# Patient Record
Sex: FEMALE | Race: OTHER
[De-identification: names, ages, dates, MRNs, and addresses within clinical notes are randomized per-mention and may not be internally consistent; named-entity substitution may affect disease eponyms.]

---

## 2020-11-29 ENCOUNTER — HOSPITAL ENCOUNTER (INPATIENT)
Dept: HOSPITAL 54 - GPS | Age: 60
LOS: 10 days | Discharge: HOME | DRG: 885 | End: 2020-12-09
Attending: INTERNAL MEDICINE | Admitting: PSYCHIATRY & NEUROLOGY
Payer: MEDICARE

## 2020-11-29 VITALS — BODY MASS INDEX: 27.82 KG/M2 | HEIGHT: 65 IN | WEIGHT: 167 LBS

## 2020-11-29 VITALS — DIASTOLIC BLOOD PRESSURE: 70 MMHG | SYSTOLIC BLOOD PRESSURE: 125 MMHG

## 2020-11-29 DIAGNOSIS — E87.6: ICD-10-CM

## 2020-11-29 DIAGNOSIS — F23: ICD-10-CM

## 2020-11-29 DIAGNOSIS — F32.9: ICD-10-CM

## 2020-11-29 DIAGNOSIS — E78.5: ICD-10-CM

## 2020-11-29 DIAGNOSIS — Z86.19: ICD-10-CM

## 2020-11-29 DIAGNOSIS — F29: Primary | ICD-10-CM

## 2020-11-29 DIAGNOSIS — F41.9: ICD-10-CM

## 2020-11-29 NOTE — NUR
ADMISSION NOTES:

ADMITTED THIS 61Y/O FEMALE PATIENT  ADMIT FROM [facility] ,ADMITTED TO 
GPS 5150 HOLD DTO, PER HOLD  DTO AND INCREASINGLY PARANOID THE PAST WEEK HAS BEEN LEAVING 
VOICE MAIL MESSAGE ON MOTHER,S PHONE THREATING AND DEMENDING MONEY MAKING  COMMENTS OR YOU 
WILL DIE BY THE HANDS OF ME ,,UPON FACE TO FACE ASSESSMENT PATIENT IS A&O X 3, FLAT AFFECT , 
BLUNTED AFFECT, GUARDED ,UNCOOPERTIVE ,ANXIOUS ,PARANOID, ANXIOUS  ,DISHELVED, DEPREESED 
,POOR HYGINE ,EASILY AGITATED,  POOR EYE CONTACT  ,DENIES SI /HI AT THIS TIME, PT. IS POOR 
HISTORIAN, POOR INSIGHT ,POOR JUDGEMENT , PT. REFUSED TO  SIGNS ADMISSION CONSENT PAPERS, 
DUE TO MENTAL STATUS , PT.REFUSED SHOWER AND  REFUSED CHANGE  TO THE HOSPITAL GOWN,  DUE TO 
MENTAL STATUS , PT. REFUSED FULL BODY SKIN ASSESSMENT AND PICTURES TAKEN, AND REFUSED FLU 
VACCINE, ENCOURAGED X3 RISKS BENEFITS EXPLINED , PT. STRONGLY REFUSED ,BOTH MD AWARE AND 
NOTIFIED OF THE ADMISSION,  BELONGINGS CONTRABAND WERE DONE ,PT. RIGHTS DISCUSS BY WRITER , 
PROVIDE THE PT. WITH HANDBOOK, AND MEDICATIONS GUIDE, ENVIRONMENTAL SAFETY CHECK DONE,  
ENCOURAGED PT. VERBALIZED ANY FEELING CONCERN TO STAFF, ORIENT TO UNIT POLICY, NO ACUTE 
DISTRESS NOTED,VITAL SIGNS  WNL ,DENIES ANY PAIN  AT THIS TIME,WILL CONTINUE TO MONITOR FOR 
Q15 SAFETY AND BEHAVIOR.

## 2020-11-30 VITALS — DIASTOLIC BLOOD PRESSURE: 69 MMHG | SYSTOLIC BLOOD PRESSURE: 119 MMHG

## 2020-11-30 VITALS — SYSTOLIC BLOOD PRESSURE: 91 MMHG | DIASTOLIC BLOOD PRESSURE: 62 MMHG

## 2020-11-30 VITALS — SYSTOLIC BLOOD PRESSURE: 92 MMHG | DIASTOLIC BLOOD PRESSURE: 59 MMHG

## 2020-11-30 LAB
ALBUMIN SERPL BCP-MCNC: 3.4 G/DL (ref 3.4–5)
ALP SERPL-CCNC: 56 U/L (ref 46–116)
ALT SERPL W P-5'-P-CCNC: 57 U/L (ref 12–78)
AST SERPL W P-5'-P-CCNC: 28 U/L (ref 15–37)
BILIRUB SERPL-MCNC: 0.7 MG/DL (ref 0.2–1)
BUN SERPL-MCNC: 14 MG/DL (ref 7–18)
CALCIUM SERPL-MCNC: 8.5 MG/DL (ref 8.5–10.1)
CHLORIDE SERPL-SCNC: 106 MMOL/L (ref 98–107)
CHOLEST SERPL-MCNC: 178 MG/DL (ref ?–200)
CO2 SERPL-SCNC: 27 MMOL/L (ref 21–32)
CREAT SERPL-MCNC: 0.8 MG/DL (ref 0.6–1.3)
GLUCOSE SERPL-MCNC: 83 MG/DL (ref 74–106)
HDLC SERPL-MCNC: 61 MG/DL (ref 40–60)
LDLC SERPL DIRECT ASSAY-MCNC: 100 MG/DL (ref 0–99)
POTASSIUM SERPL-SCNC: 3.4 MMOL/L (ref 3.5–5.1)
PROT SERPL-MCNC: 6.4 G/DL (ref 6.4–8.2)
SODIUM SERPL-SCNC: 143 MMOL/L (ref 136–145)
TRIGL SERPL-MCNC: 78 MG/DL (ref 30–150)

## 2020-11-30 RX ADMIN — METHOCARBAMOL TABLETS SCH MG: 500 TABLET, COATED ORAL at 13:58

## 2020-11-30 RX ADMIN — METHOCARBAMOL TABLETS SCH MG: 500 TABLET, COATED ORAL at 17:23

## 2020-11-30 RX ADMIN — GABAPENTIN SCH MG: 100 CAPSULE ORAL at 17:23

## 2020-11-30 RX ADMIN — METHOCARBAMOL TABLETS SCH MG: 500 TABLET, COATED ORAL at 21:02

## 2020-11-30 RX ADMIN — METHOCARBAMOL TABLETS SCH MG: 500 TABLET, COATED ORAL at 08:29

## 2020-11-30 RX ADMIN — LORAZEPAM PRN MG: 1 TABLET ORAL at 10:04

## 2020-11-30 RX ADMIN — GABAPENTIN SCH MG: 100 CAPSULE ORAL at 08:29

## 2020-11-30 RX ADMIN — GABAPENTIN SCH MG: 100 CAPSULE ORAL at 13:58

## 2020-11-30 RX ADMIN — MELOXICAM SCH MG: 7.5 TABLET ORAL at 08:29

## 2020-11-30 NOTE — NUR
RN NOTES: PT. REFUSED MRSA NARES SWAB ,REFUSED SKIN ASSESSMENT , ENCOURAGED X3 RISKS AND 
BENFITS EXPLINED ,PT. STRONGLY REFUSED,  PT. BEHAVIOR UNCOOPRTIVE AT TIS TIME,WILL CONTIUITY 
WITH CARE

## 2020-12-01 VITALS — SYSTOLIC BLOOD PRESSURE: 92 MMHG | DIASTOLIC BLOOD PRESSURE: 58 MMHG

## 2020-12-01 VITALS — DIASTOLIC BLOOD PRESSURE: 54 MMHG | SYSTOLIC BLOOD PRESSURE: 94 MMHG

## 2020-12-01 VITALS — DIASTOLIC BLOOD PRESSURE: 54 MMHG | SYSTOLIC BLOOD PRESSURE: 95 MMHG

## 2020-12-01 RX ADMIN — LORAZEPAM PRN MG: 1 TABLET ORAL at 06:12

## 2020-12-01 RX ADMIN — GABAPENTIN SCH MG: 100 CAPSULE ORAL at 08:19

## 2020-12-01 RX ADMIN — LORAZEPAM PRN MG: 1 TABLET ORAL at 08:19

## 2020-12-01 RX ADMIN — METHOCARBAMOL TABLETS SCH MG: 500 TABLET, COATED ORAL at 21:32

## 2020-12-01 RX ADMIN — LORAZEPAM PRN MG: 1 TABLET ORAL at 12:01

## 2020-12-01 RX ADMIN — GABAPENTIN SCH MG: 100 CAPSULE ORAL at 17:24

## 2020-12-01 RX ADMIN — QUETIAPINE FUMARATE SCH MG: 100 TABLET ORAL at 22:49

## 2020-12-01 RX ADMIN — METHOCARBAMOL TABLETS SCH MG: 500 TABLET, COATED ORAL at 17:24

## 2020-12-01 RX ADMIN — GABAPENTIN SCH MG: 100 CAPSULE ORAL at 12:01

## 2020-12-01 RX ADMIN — METHOCARBAMOL TABLETS SCH MG: 500 TABLET, COATED ORAL at 08:19

## 2020-12-01 RX ADMIN — MELOXICAM SCH MG: 7.5 TABLET ORAL at 08:19

## 2020-12-01 RX ADMIN — METHOCARBAMOL TABLETS SCH MG: 500 TABLET, COATED ORAL at 12:01

## 2020-12-01 NOTE — NUR
INITIAL DISCHARGE PLAN: Pt will be discharged back to her home [address] [place] [address]. AARTI will help form a safe and proper discharge in collaboration with MD.
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
## 2020-12-02 VITALS — DIASTOLIC BLOOD PRESSURE: 47 MMHG | SYSTOLIC BLOOD PRESSURE: 96 MMHG

## 2020-12-02 VITALS — DIASTOLIC BLOOD PRESSURE: 72 MMHG | SYSTOLIC BLOOD PRESSURE: 103 MMHG

## 2020-12-02 VITALS — SYSTOLIC BLOOD PRESSURE: 100 MMHG | DIASTOLIC BLOOD PRESSURE: 67 MMHG

## 2020-12-02 RX ADMIN — QUETIAPINE FUMARATE SCH MG: 100 TABLET ORAL at 08:52

## 2020-12-02 RX ADMIN — GABAPENTIN SCH MG: 100 CAPSULE ORAL at 08:51

## 2020-12-02 RX ADMIN — METHOCARBAMOL TABLETS SCH MG: 500 TABLET, COATED ORAL at 08:51

## 2020-12-02 RX ADMIN — LORAZEPAM PRN MG: 1 TABLET ORAL at 06:48

## 2020-12-02 RX ADMIN — GABAPENTIN SCH MG: 100 CAPSULE ORAL at 17:49

## 2020-12-02 RX ADMIN — GABAPENTIN SCH MG: 100 CAPSULE ORAL at 12:25

## 2020-12-02 RX ADMIN — METHOCARBAMOL TABLETS SCH MG: 500 TABLET, COATED ORAL at 17:49

## 2020-12-02 RX ADMIN — METHOCARBAMOL TABLETS SCH MG: 500 TABLET, COATED ORAL at 12:24

## 2020-12-02 RX ADMIN — LORAZEPAM PRN MG: 1 TABLET ORAL at 12:25

## 2020-12-02 RX ADMIN — MELOXICAM SCH MG: 7.5 TABLET ORAL at 08:52

## 2020-12-02 RX ADMIN — METHOCARBAMOL TABLETS SCH MG: 500 TABLET, COATED ORAL at 20:50

## 2020-12-02 RX ADMIN — QUETIAPINE FUMARATE SCH MG: 100 TABLET ORAL at 17:49

## 2020-12-02 NOTE — NUR
GPS RN NOTE: ANXIETY

PT C/O ANXIETY REQUESTED ATIVAN, VSS, ADMIN ATIVAN 1MG PRN @ 0648, WILL REASSESS AND 
CONTINUE TO MONITOR Q15MIN FOR SAFETY AND BEHAVIOR.

## 2020-12-03 VITALS — DIASTOLIC BLOOD PRESSURE: 53 MMHG | SYSTOLIC BLOOD PRESSURE: 102 MMHG

## 2020-12-03 VITALS — SYSTOLIC BLOOD PRESSURE: 99 MMHG | DIASTOLIC BLOOD PRESSURE: 59 MMHG

## 2020-12-03 VITALS — DIASTOLIC BLOOD PRESSURE: 62 MMHG | SYSTOLIC BLOOD PRESSURE: 108 MMHG

## 2020-12-03 RX ADMIN — QUETIAPINE FUMARATE SCH MG: 100 TABLET ORAL at 16:01

## 2020-12-03 RX ADMIN — METHOCARBAMOL TABLETS SCH MG: 500 TABLET, COATED ORAL at 21:00

## 2020-12-03 RX ADMIN — GABAPENTIN SCH MG: 100 CAPSULE ORAL at 16:01

## 2020-12-03 RX ADMIN — METHOCARBAMOL TABLETS SCH MG: 500 TABLET, COATED ORAL at 16:02

## 2020-12-03 RX ADMIN — METHOCARBAMOL TABLETS SCH MG: 500 TABLET, COATED ORAL at 08:13

## 2020-12-03 RX ADMIN — METHOCARBAMOL TABLETS SCH MG: 500 TABLET, COATED ORAL at 12:24

## 2020-12-03 RX ADMIN — GABAPENTIN SCH MG: 100 CAPSULE ORAL at 12:25

## 2020-12-03 RX ADMIN — GABAPENTIN SCH MG: 100 CAPSULE ORAL at 08:12

## 2020-12-03 RX ADMIN — LORAZEPAM PRN MG: 1 TABLET ORAL at 13:00

## 2020-12-03 RX ADMIN — QUETIAPINE FUMARATE SCH MG: 100 TABLET ORAL at 08:11

## 2020-12-03 RX ADMIN — MELOXICAM SCH MG: 7.5 TABLET ORAL at 08:13

## 2020-12-03 RX ADMIN — LORAZEPAM PRN MG: 1 TABLET ORAL at 06:49

## 2020-12-03 RX ADMIN — LORAZEPAM PRN MG: 1 TABLET ORAL at 23:40

## 2020-12-03 NOTE — NUR
GPS RN NOTE: MEDICATION REFUSAL



PATIENT REUSED TO TAKE ROBAXIN 500 MG AS SCHEDULED, STATED," I TOOK IT ALREADY SO MANY 
TIMES, I DON'T NEED IT TONIGHT." DESPITE OF RISKS & BENEFIT EXPLANATIONS PATIENT CONTINUED 
TO REFUSE ROBAXIN X 3. WILL CONTINUE TO MONITOR.

## 2020-12-03 NOTE — NUR
GPS RN NOTE: ANXIETY



PATIENT VERBALIZED OF FEELING ANXIOUS, RESTLESS & REQUESTED TO GET ATIVAN. PRN ATIVAN 1 MG 
PO GIVEN AS ORDERED. WILL CONTINUE TO MONITOR FOR ANY CHANGES.

## 2020-12-04 VITALS — SYSTOLIC BLOOD PRESSURE: 101 MMHG | DIASTOLIC BLOOD PRESSURE: 65 MMHG

## 2020-12-04 VITALS — DIASTOLIC BLOOD PRESSURE: 59 MMHG | SYSTOLIC BLOOD PRESSURE: 111 MMHG

## 2020-12-04 VITALS — SYSTOLIC BLOOD PRESSURE: 103 MMHG | DIASTOLIC BLOOD PRESSURE: 69 MMHG

## 2020-12-04 RX ADMIN — LORAZEPAM PRN MG: 1 TABLET ORAL at 06:52

## 2020-12-04 RX ADMIN — METHOCARBAMOL TABLETS SCH MG: 500 TABLET, COATED ORAL at 21:04

## 2020-12-04 RX ADMIN — GABAPENTIN SCH MG: 100 CAPSULE ORAL at 12:23

## 2020-12-04 RX ADMIN — QUETIAPINE FUMARATE SCH MG: 100 TABLET ORAL at 16:20

## 2020-12-04 RX ADMIN — METHOCARBAMOL TABLETS SCH MG: 500 TABLET, COATED ORAL at 08:38

## 2020-12-04 RX ADMIN — MELOXICAM SCH MG: 7.5 TABLET ORAL at 08:38

## 2020-12-04 RX ADMIN — METHOCARBAMOL TABLETS SCH MG: 500 TABLET, COATED ORAL at 12:23

## 2020-12-04 RX ADMIN — LORAZEPAM PRN MG: 1 TABLET ORAL at 20:07

## 2020-12-04 RX ADMIN — GABAPENTIN SCH MG: 100 CAPSULE ORAL at 16:20

## 2020-12-04 RX ADMIN — METHOCARBAMOL TABLETS SCH MG: 500 TABLET, COATED ORAL at 16:20

## 2020-12-04 RX ADMIN — GABAPENTIN SCH MG: 100 CAPSULE ORAL at 08:38

## 2020-12-04 RX ADMIN — LORAZEPAM PRN MG: 1 TABLET ORAL at 13:01

## 2020-12-04 RX ADMIN — QUETIAPINE FUMARATE SCH MG: 100 TABLET ORAL at 08:38

## 2020-12-04 NOTE — NUR
GPS RN NOTE: ANXIETY



PATIENT VERBALIZED OF FEELING ANXIOUS, RESTLESS & REQUESTED TO GET ATIVAN. PRN ATIVAN 1 MG 
PO GIVEN AS ORDERED. WILL ENDORSE TO AM RN TO MONITOR FOR ANY CHANGES.

## 2020-12-05 VITALS — SYSTOLIC BLOOD PRESSURE: 101 MMHG | DIASTOLIC BLOOD PRESSURE: 62 MMHG

## 2020-12-05 VITALS — SYSTOLIC BLOOD PRESSURE: 94 MMHG | DIASTOLIC BLOOD PRESSURE: 56 MMHG

## 2020-12-05 VITALS — SYSTOLIC BLOOD PRESSURE: 111 MMHG | DIASTOLIC BLOOD PRESSURE: 57 MMHG

## 2020-12-05 RX ADMIN — METHOCARBAMOL TABLETS SCH MG: 500 TABLET, COATED ORAL at 13:15

## 2020-12-05 RX ADMIN — LORAZEPAM PRN MG: 1 TABLET ORAL at 15:51

## 2020-12-05 RX ADMIN — LORAZEPAM PRN MG: 1 TABLET ORAL at 06:33

## 2020-12-05 RX ADMIN — SERTRALINE HYDROCHLORIDE SCH MG: 50 TABLET, FILM COATED ORAL at 08:53

## 2020-12-05 RX ADMIN — QUETIAPINE FUMARATE SCH MG: 100 TABLET ORAL at 17:35

## 2020-12-05 RX ADMIN — METHOCARBAMOL TABLETS SCH MG: 500 TABLET, COATED ORAL at 17:33

## 2020-12-05 RX ADMIN — MELOXICAM SCH MG: 7.5 TABLET ORAL at 08:53

## 2020-12-05 RX ADMIN — GABAPENTIN SCH MG: 100 CAPSULE ORAL at 13:15

## 2020-12-05 RX ADMIN — METHOCARBAMOL TABLETS SCH MG: 500 TABLET, COATED ORAL at 20:31

## 2020-12-05 RX ADMIN — QUETIAPINE FUMARATE SCH MG: 100 TABLET ORAL at 08:53

## 2020-12-05 RX ADMIN — GABAPENTIN SCH MG: 100 CAPSULE ORAL at 08:53

## 2020-12-05 RX ADMIN — METHOCARBAMOL TABLETS SCH MG: 500 TABLET, COATED ORAL at 08:53

## 2020-12-05 RX ADMIN — GABAPENTIN SCH MG: 100 CAPSULE ORAL at 17:33

## 2020-12-05 NOTE — NUR
RN NOTES: ANXIETY 

PT.C/O FEELING ANXIOUS ,ATIVAN 1 MG PO PRN GIVEN PER PT. REQUEST,WILL CONTINUE TO MONITOR.

## 2020-12-06 VITALS — SYSTOLIC BLOOD PRESSURE: 91 MMHG | DIASTOLIC BLOOD PRESSURE: 42 MMHG

## 2020-12-06 VITALS — DIASTOLIC BLOOD PRESSURE: 64 MMHG | SYSTOLIC BLOOD PRESSURE: 100 MMHG

## 2020-12-06 VITALS — SYSTOLIC BLOOD PRESSURE: 118 MMHG | DIASTOLIC BLOOD PRESSURE: 69 MMHG

## 2020-12-06 RX ADMIN — QUETIAPINE FUMARATE SCH MG: 100 TABLET ORAL at 08:22

## 2020-12-06 RX ADMIN — LORAZEPAM PRN MG: 1 TABLET ORAL at 14:06

## 2020-12-06 RX ADMIN — GABAPENTIN SCH MG: 100 CAPSULE ORAL at 08:22

## 2020-12-06 RX ADMIN — MELOXICAM SCH MG: 7.5 TABLET ORAL at 08:22

## 2020-12-06 RX ADMIN — LORAZEPAM PRN MG: 1 TABLET ORAL at 06:16

## 2020-12-06 RX ADMIN — METHOCARBAMOL TABLETS SCH MG: 500 TABLET, COATED ORAL at 16:14

## 2020-12-06 RX ADMIN — SERTRALINE HYDROCHLORIDE SCH MG: 50 TABLET, FILM COATED ORAL at 08:22

## 2020-12-06 RX ADMIN — QUETIAPINE FUMARATE SCH MG: 100 TABLET ORAL at 16:13

## 2020-12-06 RX ADMIN — GABAPENTIN SCH MG: 100 CAPSULE ORAL at 16:13

## 2020-12-06 RX ADMIN — GABAPENTIN SCH MG: 100 CAPSULE ORAL at 12:13

## 2020-12-06 RX ADMIN — METHOCARBAMOL TABLETS SCH MG: 500 TABLET, COATED ORAL at 12:13

## 2020-12-06 RX ADMIN — METHOCARBAMOL TABLETS SCH MG: 500 TABLET, COATED ORAL at 21:13

## 2020-12-06 RX ADMIN — METHOCARBAMOL TABLETS SCH MG: 500 TABLET, COATED ORAL at 08:27

## 2020-12-06 NOTE — NUR
RN NOTE- PT IN ROOM AND IN HALLS VISIBLE ON UNIT ALERT ORIENTED TO PERSON PLACE TIME AND 
SITUATION MED COMPLIANT NEEDS ATTENDED DENIES ALL

## 2020-12-07 VITALS — DIASTOLIC BLOOD PRESSURE: 63 MMHG | SYSTOLIC BLOOD PRESSURE: 91 MMHG

## 2020-12-07 VITALS — SYSTOLIC BLOOD PRESSURE: 118 MMHG | DIASTOLIC BLOOD PRESSURE: 59 MMHG

## 2020-12-07 VITALS — DIASTOLIC BLOOD PRESSURE: 60 MMHG | SYSTOLIC BLOOD PRESSURE: 110 MMHG

## 2020-12-07 VITALS — DIASTOLIC BLOOD PRESSURE: 60 MMHG | SYSTOLIC BLOOD PRESSURE: 93 MMHG

## 2020-12-07 RX ADMIN — METHOCARBAMOL TABLETS SCH MG: 500 TABLET, COATED ORAL at 20:39

## 2020-12-07 RX ADMIN — LORAZEPAM PRN MG: 1 TABLET ORAL at 20:38

## 2020-12-07 RX ADMIN — QUETIAPINE FUMARATE SCH MG: 100 TABLET ORAL at 16:10

## 2020-12-07 RX ADMIN — GABAPENTIN SCH MG: 100 CAPSULE ORAL at 13:08

## 2020-12-07 RX ADMIN — GABAPENTIN SCH MG: 100 CAPSULE ORAL at 08:29

## 2020-12-07 RX ADMIN — METHOCARBAMOL TABLETS SCH MG: 500 TABLET, COATED ORAL at 16:10

## 2020-12-07 RX ADMIN — METHOCARBAMOL TABLETS SCH MG: 500 TABLET, COATED ORAL at 13:07

## 2020-12-07 RX ADMIN — QUETIAPINE FUMARATE SCH MG: 100 TABLET ORAL at 08:28

## 2020-12-07 RX ADMIN — LORAZEPAM PRN MG: 1 TABLET ORAL at 06:23

## 2020-12-07 RX ADMIN — LORAZEPAM PRN MG: 1 TABLET ORAL at 13:07

## 2020-12-07 RX ADMIN — SERTRALINE HYDROCHLORIDE SCH MG: 50 TABLET, FILM COATED ORAL at 08:28

## 2020-12-07 RX ADMIN — GABAPENTIN SCH MG: 100 CAPSULE ORAL at 16:10

## 2020-12-07 RX ADMIN — MELOXICAM SCH MG: 7.5 TABLET ORAL at 08:28

## 2020-12-07 RX ADMIN — METHOCARBAMOL TABLETS SCH MG: 500 TABLET, COATED ORAL at 08:28

## 2020-12-07 NOTE — NUR
GPS RN NOTE: ANXIETY

PT C/O OF ANXIETY, ADMIN PRN ATIVAN @ 0623, VSS, BP: 110/60, HR: 57, WILL REASSESS AND 
CONTINUE TO MONITOR Q15MIN FRO SAFETY AND BEHAVIOR

## 2020-12-08 VITALS — SYSTOLIC BLOOD PRESSURE: 108 MMHG | DIASTOLIC BLOOD PRESSURE: 55 MMHG

## 2020-12-08 VITALS — SYSTOLIC BLOOD PRESSURE: 97 MMHG | DIASTOLIC BLOOD PRESSURE: 65 MMHG

## 2020-12-08 VITALS — DIASTOLIC BLOOD PRESSURE: 82 MMHG | SYSTOLIC BLOOD PRESSURE: 111 MMHG

## 2020-12-08 RX ADMIN — LORAZEPAM PRN MG: 1 TABLET ORAL at 22:12

## 2020-12-08 RX ADMIN — GABAPENTIN SCH MG: 100 CAPSULE ORAL at 17:23

## 2020-12-08 RX ADMIN — METHOCARBAMOL TABLETS SCH MG: 500 TABLET, COATED ORAL at 12:58

## 2020-12-08 RX ADMIN — METHOCARBAMOL TABLETS SCH MG: 500 TABLET, COATED ORAL at 21:00

## 2020-12-08 RX ADMIN — LORAZEPAM PRN MG: 1 TABLET ORAL at 12:58

## 2020-12-08 RX ADMIN — METHOCARBAMOL TABLETS SCH MG: 500 TABLET, COATED ORAL at 17:23

## 2020-12-08 RX ADMIN — MELOXICAM SCH MG: 7.5 TABLET ORAL at 08:51

## 2020-12-08 RX ADMIN — GABAPENTIN SCH MG: 100 CAPSULE ORAL at 08:51

## 2020-12-08 RX ADMIN — GABAPENTIN SCH MG: 100 CAPSULE ORAL at 12:58

## 2020-12-08 RX ADMIN — QUETIAPINE FUMARATE SCH MG: 100 TABLET ORAL at 17:25

## 2020-12-08 RX ADMIN — SERTRALINE HYDROCHLORIDE SCH MG: 50 TABLET, FILM COATED ORAL at 08:51

## 2020-12-08 RX ADMIN — METHOCARBAMOL TABLETS SCH MG: 500 TABLET, COATED ORAL at 08:52

## 2020-12-08 RX ADMIN — QUETIAPINE FUMARATE SCH MG: 100 TABLET ORAL at 08:52

## 2020-12-08 RX ADMIN — LORAZEPAM PRN MG: 1 TABLET ORAL at 06:10

## 2020-12-08 NOTE — NUR
RN Note:



Patient refused her scheduled Robaxin 500mg PO at 21:00.Requested and given Ativan 1 mg PO 
with good effect.

## 2020-12-09 VITALS — SYSTOLIC BLOOD PRESSURE: 111 MMHG | DIASTOLIC BLOOD PRESSURE: 76 MMHG

## 2020-12-09 RX ADMIN — METHOCARBAMOL TABLETS SCH MG: 500 TABLET, COATED ORAL at 08:07

## 2020-12-09 RX ADMIN — SERTRALINE HYDROCHLORIDE SCH MG: 50 TABLET, FILM COATED ORAL at 08:06

## 2020-12-09 RX ADMIN — MELOXICAM SCH MG: 7.5 TABLET ORAL at 08:07

## 2020-12-09 RX ADMIN — LORAZEPAM PRN MG: 1 TABLET ORAL at 06:56

## 2020-12-09 RX ADMIN — GABAPENTIN SCH MG: 100 CAPSULE ORAL at 08:07

## 2020-12-09 RX ADMIN — QUETIAPINE FUMARATE SCH MG: 100 TABLET ORAL at 08:15

## 2020-12-09 NOTE — NUR
Discharge Note:



Patient will be discharged home [address] [place] [address] ([phone]). 
Patients boyfriencesar Burton ([phone]) will  patient at 11AM. Patient presents 
alert and oriented x4, is aware and agreeable with her discharge plan and denies suicidal or 
homicidal ideation. Patient presents with appropriate mood and congruent affect. Patient 
will be following up with her primary care physician Dr. Mena Hennessy [address] [place] [address], [place] [address] ([phone]) and has a follow up appointment scheduled on December 17, 2020 at 1PM. Spoke with Nanci  at the clinic (ext. [phone]) who stated that they 
will provide a referral for outpatient psychiatrist and monitor the patients psychotropic 
medications. Patients motherSuzette ([phone]) is made aware and is agreeable with 
discharge plan.

## 2020-12-09 NOTE — NUR
SW Family Contact:



This writer spoke with patient's boyfriend Micheal ([phone]) who stated he will  
pt at 11AM.

## 2020-12-09 NOTE — NUR
AARTI Discharge Note:



Patient will be discharged home [address] [place] [address] ([phone]). 
Transportation is provided through [facility] (agreement is attached to 
chart). Patient presents alert and oriented x4, is aware and agreeable with her discharge 
plan and denies suicidal or homicidal ideation. Patient presents with appropriate mood and 
congruent affect. Patient will be following up with her primary care physician Dr. Mena Hennessy [address] , [place] [address] ([phone]) and has a follow up 
appointment scheduled on December 17, 2020 at 1PM. Spoke with Nanci  at the 
clinic (ext. [phone]) who stated that they will provide a referral for outpatient psychiatrist 
and monitor the patients psychotropic medications. Patients Suzette horan ([phone]) 
is made aware and is agreeable with discharge plan. 

-------------------------------------------------------------------------------

Addendum: 12/09/20 at 0841 by AARTI BROWN

-------------------------------------------------------------------------------

Disregard discharge note

## 2020-12-09 NOTE — NUR
RN-CO: PATIENT IS ALERT AND ORIENTED, X4, ABLE TO MAKE NEEDS KNOWN WITH STEADY GAIT. SHE 
REMAINS CALM AND COOPERATIVE TO CARE. SHE DENIES SI/HI. DENIES AH/VH. DR PEÑA WROTE HER 
PRESCRIPTIONS AND ORDERED TO DISCHARGE PATIENT AND DISCONTINUE HOLD, NOTED AND CARRIED OUT. 
SHE IS MEDICALLY CLEARED FOR DISCHARGE BY DR YADIRA VELAZQUEZ. I EXPLAINED THE PRESCRIPTIONS AND 
DISCHARGE PAPERS TO THE PATIENT INCL HER APPOINTMENT TO HER PRIMARY DOCTOR ON DEC 17,2020 AT 
1:00 PM AND SHE VERBALIZED UNDERSTANDING. SHE REFUSED SKIN CHECK AND FLU VACCINE. ALL HER 
BELONGINGS WILL BE GIVEN BACK TO THE PATIENT. PER PATIENT, HER BOYFRIEND HAYDEN WILL PICK HER 
UP.

## 2021-02-18 NOTE — NUR
Patient has been isolative and withdrawn to her assigned bedroom all shift. Appears to be 
asleep. When this writer attempted to conduct psychosocial assessment, patient initially 
ignored writer and then refused after multiple attempts at prompting. patient continued to 
refuse and be non-cooperative. patient refused PO K-dur that was ordered for low potassium 
level. provided with education about importance of medication but patient became angry and 
cursed at staff. patient educated about impulse control and communicating needs to staff 
appropriately. patient remains in her assigned bed. she is dismissive, angry, and easily 
irritable and becomes agitated.

## 2021-02-18 NOTE — NUR
SW Substance Abuse Intervention:



Patient was provided with a brief substance abuse intervention and referred to [facility] ([phone]), [facility] ([phone]), and [facility] ([phone]).

## 2021-02-18 NOTE — NUR
Initial Discharge Plan:



Patient is currently homeless and will need a nursing facility. Patient does not have any 
family contact at this moment. This SW will work with the MD and treatment team to help 
coordinate discharge plan.

## 2021-02-18 NOTE — NUR
EMERGENCY MEDICATION NOTE:



1215: patient walked to nurse station and became undressed exposing herself to staff. 
patient instructed to wear gown but she became angry and cursed at staff. walked back to her 
room.

1230: patient walked to nurses station demanding for pain medication, once again exposing 
her breasts to staff. patient instructed to wear gown and she immediately became angry, 
agitated, and threatened staff. when patient was provided with redirection and reality 
orientation, patient "if she's not here soon  I'm going to fuck her up". patient was 
provided with education about how to appropriately communicate needs and impulse control, 
but she escalated in behavior and began threatening staff more. patient has no regard for 
her safety or the safety of others.

1235: MD called. Orders received for haldol 5 mg, ativan 1 mg, and benadryl 25 mg. orders 
noted and carried out. security called.

1245: IM given with no adverse reaction. patient provided with education about medication. 
provided with reality orientation and redirection. patient's safety maintained. educated 
about impulse control.

1300: patient remains quiet and laying in her assigned bed.

## 2021-02-18 NOTE — NUR
Firearms Report:



 completed and submitted a [facility] firearms report for 5150 grave disability 
certification. A copy of report has been placed in patient chart.

-------------------------------------------------------------------------------

Addendum: 02/18/21 at 0853 by HOMA ANDERSON

-------------------------------------------------------------------------------

Firearms Report:



 completed and submitted a [facility] firearms report for 5150 grave disability 
certification. A copy of report has been placed in patient chart.

## 2021-02-18 NOTE — NUR
Pt remains in assigned room in bed sleeping. No s/s of acute distress noted. Unable to give 
PO Zyprexa Zydis d/t pt being asleep. Frequent monitoring implemented, patient safety 
maintained throughout shift. Will endorse care to night shift.

## 2021-02-19 NOTE — NUR
Family Contact:



This SW spoke with patient's mother Roxie ([phone]) and gathered collateral. Per 
mother, patient lives at [address]. Per mother, she stated that 
patient cannot come back home and she will need a nursing facility. Per Roxie, she stated 
that she needs authorization for her vehicle to be released to her mother Roxie 
([phone]). This SW will consult with supervisor and will contact Roxie.

## 2021-02-19 NOTE — NUR
SW Coordination of Care:



This SW sent clinicals to [facility] ([phone]) to [facility] for placement. 
This SW sent medication list, laboratory, and [facility] psychiatric notes.

## 2021-02-19 NOTE — NUR
SW Family:



This SW spoke with patient's mother Roxie ([phone]) and stated that patient is refusing 
to sign to release her vehicle to her mother Roxie.

## 2021-02-19 NOTE — NUR
Individual Therapy: 



 met with patient for brief counseling to address patient's presenting problem 
disorganized thought content. Patient was asleep and was guarded with this SW. Patient did 
not want to have a conversation with this SW at this moment.

## 2021-02-19 NOTE — NUR
SW Note:



This SW met with patient who stated that she will not sign to release her vehicle to her 
mother Roxie ([phone]).

## 2021-02-19 NOTE — NUR
SNF Contact:



This SW received a phone call from EDER from [facility] ([phone]) who stated that 
patient is accepted.

## 2021-02-20 NOTE — NUR
RECEIVED PATIENT ASLEEP IN BED PATIENT ISOLATIVE WITHDRAWN, CALM COOPERATIVE, DENEIS SI AND 
HI. ON MONITORING FOR SAFETY

## 2021-02-20 NOTE — NUR
PATIENT WAS SEEN AND EXAMINED BY , PATIENT REMAIN ISOLATIVE , WITHDRAWN, REFUSED 
TO TAKE SHOWER , PATIENT COMPLIANT WITH MEDICATION , PATIENT MONITORED Q 15MINUTES FOR 
SAFETY NO SIGN OF DISTRESS AT THIS TIME

## 2021-02-20 NOTE — NUR
GPS: Pt remain calm and cooperative. No s/s of acute distress noted. Frequent monitoring 
implemented, patient safety maintained throughout shift. slept 7.15 hrs through the night. 
continue plan of care.

## 2021-02-21 NOTE — NUR
Patient left resting in bed.  No sign of distress noted.  All medications given as ordered. 
Patient denies SI and HA. Safety precautions are in place.  Will endorse to the oncoming 
nurse.

## 2021-02-21 NOTE — NUR
GPS: Pt remain calm and cooperative. No s/s of acute distress noted. Frequent monitoring 
implemented, patient safety maintained throughout shift. slept 7.30 hrs through the night. 
continue plan of care.

## 2021-02-21 NOTE — NUR
Received patient lying in bed asleep but easily aroused.  No sign of distress noted.  
Patient denies any SI or Hallucinations at this time. Frequent monitoring in place.  Safety 
precautions are in place.  Will continue to monitor

## 2021-02-22 NOTE — NUR
PATIENT REQUESTED FOR ATIVAN STATED FEEL NERVOUS MEDICATED AS ORDERED AND PATIENT ENCOURAGED 
TO EXPRESS FEELINGS AND SHE EXPRESSED UNDERSTANDING

## 2021-02-22 NOTE — NUR
SW Family:



This SW received a phone call from patient's mother Roxie ([phone]) who stated the note 
for the vehicle is no longer needed. She was able to handle the situation.

## 2021-02-22 NOTE — NUR
Individual Therapy: 



 met with patient for brief counseling to address patient's presenting problem 
disorganized thought content. Patient appeared with euthymic mood. Patient appeared 
well-groomed and was able to have a proper conversation with this SW. Patient stated that 
she has been compliant with her treatment and has goals for herself. Patient expressed that 
she is open with continuing her treatment and wants to get "better". SW actively listened, 
provided support, and guidance.

## 2021-02-22 NOTE — NUR
PATIENT HAS BEEN VERY INTRUSIVE ALWAYS STANDING AT THE NURSES STATED ASKING FOR ONE THING OR 
THE OTHER PATIENT ENCOURAGED TO ATTEND AND PARTICIPATE IN ACTIVITIES AND SHE EXPRESSED 
UNDERSTANDING.

## 2021-02-22 NOTE — NUR
Patient slept 6.30 hours last night. Asked for her PRN medications in the PM and AM. This 
patient denies SI at this time and was calm and cooperative during the shift. Continuing to 
monitor for safety , and continuing with the  plan of care.

## 2021-02-22 NOTE — NUR
STATED HAVING GENERALISED ACHES MEDICATED WITH TYLENOL AS ORDERED MADE COMFORTABLE WILL 
CONTINUE TO OBSERVE.

## 2021-02-23 NOTE — NUR
SW Note:



This SW met with patient and discuss discharge planning. This SW discussed she is accepted 
at [facility] and she was agreeable with this plan.

## 2021-02-23 NOTE — NUR
Received pt resting in bed. Pt had shower and watched tv in the dining room. Pt safely back 
in bed. Denies SI/ hallucination. Able to CFS. Safety measures maintained. Will continue to 
monitor.

## 2021-02-23 NOTE — NUR
patient verbalized being anxious, denied suicidal thoughts, meds administered, cooperative, 
monitored per facility protocol

## 2021-02-24 NOTE — NUR
SW Family Contact:



This SW contacted patient's mother Roxie ([phone]) and left a voicemail that patient 
will be discharged 2/26/21 to [facility].

## 2021-02-24 NOTE — NUR
SW Family Contact:



This SW  received a phone call from patient's mother Roxie ([phone]) who stated she 
gives permission for aunt Concepcion to be involved in patient's care as she is patient's payee.

## 2021-02-24 NOTE — NUR
SW Note:



Patient became verbally abusive aggressive towards . She became hostile after 
PC hearing stating "fucking bitch... you full of shit". This SW notified .

## 2021-02-24 NOTE — NUR
SW Note:



This SW spoke with patient in regards to discharge planning. Patient approached this SW and 
stated that she would want to go to the nursing facility. This SW expressed that she is 
accepted at [facility] and pt was agreeable with this plan.

## 2021-02-24 NOTE — NUR
SW Family Contact:



This SW received a phone call from patient's sister Concepcion ([phone]) who stated that she 
is worried about her sister Roxie who takes care of patient.

## 2021-02-24 NOTE — NUR
Pt was requesting a Vistaryl, stated that the Ativan given earlier was not effective. Pt 
became more agitated after the court hearing. Pt became verbally abusive to the staff, 
yelling and threatening staff and accusing of abusing and keeping her hear illegally. Pt was 
yelling out loudly, disturbing to the unit, calling the staff liars and "fuckers and 
bitches." Dr. John was contacted and received an order for Haldol 5 mg, Behadryl 25 mg, 
Ativan 2 mg IM was received and given at 1045 with a presence of security. Pt tolerated 
well. Pt is still agitated and yelling at staff. Will continue to monitor.

## 2021-02-24 NOTE — NUR
SW Family Contact:



This SW spoke with patient's mother Roxie ([phone]) who stated that she is afraid of pt 
coming back home and would want pt to go to [facility] to continue her care. 
Patient's mother Roxie stated that if pt were to come back home she will continue to be 
agitated and uncooperative with her care at home.

## 2021-02-24 NOTE — NUR
Court Hearing:



Patient's court hearing is scheduled today and patient was upheld for GD. Patient will be 
filing for a writ.

## 2021-02-25 NOTE — NUR
Awake, alert, oriented x 3, anxious and complaining of generalized pain. Ativan and Tylenol 
po PRN given

## 2021-02-25 NOTE — NUR
SW Family Contact:



This SW received a phone call from patient's sister Concepcion ([phone]) who stated that 
patient's mother Roxie is currently unavailable to speak to this SW due to getting her 
second covid vaccine shot. This SW spoke with Concepcion and stated patient will be discharged 
tomorrow 2/26/21 to [facility]. Concepcion wanted guidance on DPOA and this SW provided 
[facility] office to consult with a .

## 2021-02-25 NOTE — NUR
Individual Therapy: 



 met with patient for brief counseling to address patient's presenting problem 
disorganized thought content. Patient appeared calm and cooperative, however, patient was 
quiet and did not want to further discuss with this SW. Patient wanted to rest. This SW 
unable to conduct individual counseling at this time.

## 2021-02-25 NOTE — NUR
PT SLEPT 7.3 H. PRESCRIBED MEDICATION GIVEN AND PT TOLERATED IT WELL. PT GIVEN RESTORIL PRN 
AT 0224H. SAFETY AND COMFORT PROVIDED. ALL NEEDS ARE MET. WILL ENDORSE TO INCOMING NURSE FOR 
CONTINUITY OF CARE.

## 2021-02-26 NOTE — NUR
RECEIVED PATIENT ALERT ORIENTED X4, CALM COOPERATIVE, PATIENT DENIES SI AND HI, PATIENT 
DENIES SI AND HI, PATIENT HAS DISCHARGE PLANNING TO [facility], PATIENT WAS PARTICIPATING 
WITH THE DISCHARGE  PROCESS, AND SIGNED BELONGING AND VALUABLES, PATIENT MEDICATION LIST WAS 
GIVEN TO THE SNF,  PATIENT TRANSPORTED TO THE SNF VIA AMBULANCE,

## 2021-02-26 NOTE — NUR
SW Discharge Note:



Patient will be discharged to skilled nursing facility to [facility] 
[address] [place] [address]; Phone: ([phone]) via ambulance transportation at 
1PM.  spoke with EDER, Admissions Coordinator at [facility]; ([phone]), who stated patient will be accepted at facility today.  Patients 
mother Roxie ([phone]) is aware and agreeable with discharge. Patient is alert and 
oriented x2 and is not able to plan for self-care at this time but is willing to accept care 
provided for her at the facility. Patient denies any suicidal or homicidal ideations. 
Patient is aware and agreeable with discharge plans. Patient will continue to follow-up with 
her (Psychiatrist) Dr. John and (Internist) Dr. Cavazos at [facility] [address] [place] [address]; Phone: ([phone]). Patient was provided 
referrals to the following substance abuse programs for cocaine use: [facility] 
Substance Abuse Self-helpline ([phone]); [facility] [address] [place] [address] ([phone]); [facility] [address] [facility] [place] [address] 
([phone]); [facility] ([phone]); [facility] ([phone]); [facility] ([phone]); [facility] 
([phone]). Patient signed the homeless waiver upon discharge and a copy was placed in 
the chart. Homeless resources were provided and include [address] information line for shelters 
and homeless resources. A copy of all resources given to patient was also placed in the 
chart. Patient presents with euthymic mood and euthymic affect.

## 2022-08-27 ENCOUNTER — HOSPITAL ENCOUNTER (INPATIENT)
Dept: HOSPITAL 12 - ER | Age: 62
LOS: 12 days | Discharge: HOME | DRG: 885 | End: 2022-09-08
Payer: MEDICARE

## 2022-08-27 VITALS — BODY MASS INDEX: 27.32 KG/M2 | HEIGHT: 66 IN | WEIGHT: 170 LBS

## 2022-08-27 DIAGNOSIS — Z90.710: ICD-10-CM

## 2022-08-27 DIAGNOSIS — F41.9: ICD-10-CM

## 2022-08-27 DIAGNOSIS — Z73.6: ICD-10-CM

## 2022-08-27 DIAGNOSIS — F19.11: ICD-10-CM

## 2022-08-27 DIAGNOSIS — M79.7: ICD-10-CM

## 2022-08-27 DIAGNOSIS — F25.0: Primary | ICD-10-CM

## 2022-08-27 DIAGNOSIS — F32.A: ICD-10-CM

## 2022-08-27 DIAGNOSIS — Z86.19: ICD-10-CM

## 2022-08-27 DIAGNOSIS — F29: ICD-10-CM

## 2022-08-27 PROCEDURE — A4663 DIALYSIS BLOOD PRESSURE CUFF: HCPCS

## 2022-08-28 VITALS — SYSTOLIC BLOOD PRESSURE: 102 MMHG | DIASTOLIC BLOOD PRESSURE: 76 MMHG

## 2022-08-28 VITALS — SYSTOLIC BLOOD PRESSURE: 93 MMHG | DIASTOLIC BLOOD PRESSURE: 68 MMHG

## 2022-08-28 RX ADMIN — OLANZAPINE SCH MG: 5 TABLET ORAL at 11:00

## 2022-08-28 RX ADMIN — OLANZAPINE SCH MG: 5 TABLET ORAL at 18:00

## 2022-08-28 NOTE — NUR
GPS:   Pt. is less agitated, more re-directable. Refused vital signs earlier despite 
explanation of importance. Bed alarm on for safety. Needs attended. Behavior monitoring 
continues.
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
## 2022-08-28 NOTE — NUR
Patient became combative, agitated, aggressive, threatening physical harm to others and 
self, throwing bathroom door on the floor, banging walls and doors. Thorazine 50 mg/ 2ml IM 
and Ativan 1 mg/ml IM were given at 09:36, will be monitored for effectiveness. Security was 
called and 4 people were necessary to administer medications, but no force was needed. Pt. 
refuses Vital signs. Reassurance given. Fall and safety precautions implemented.

## 2022-08-28 NOTE — NUR
Pt. is verbally abusive, intrusive, agitated, non cooperative with nursing care, refusing 
medications. Pt. is A/O X 3 to person, place. Ambulates independently. Fall and safety 
precautions implemented.

## 2022-08-29 VITALS — SYSTOLIC BLOOD PRESSURE: 108 MMHG | DIASTOLIC BLOOD PRESSURE: 65 MMHG

## 2022-08-29 RX ADMIN — ACETAMINOPHEN PRN MG: 325 TABLET ORAL at 18:26

## 2022-08-29 RX ADMIN — OLANZAPINE SCH MG: 5 TABLET ORAL at 16:10

## 2022-08-29 RX ADMIN — OLANZAPINE SCH MG: 5 TABLET ORAL at 08:20

## 2022-08-29 NOTE — NUR
patient is alert and oriented x3, refused am medication and breakfast,lying in bed refused 
to attend in group activity .denies any SI/HI . MD made aware of patient not compliant with 
care and medication.will continue close monitoring.

## 2022-08-29 NOTE — NUR
GPS:  Awake at this time eating snacks. Calm and cooperative at this time. No aggressive 
behavior noted. Encouraged to change clothes/shower but declined right now. Safe environment 
provided. Will continue to monitor.

## 2022-08-30 VITALS — DIASTOLIC BLOOD PRESSURE: 56 MMHG | SYSTOLIC BLOOD PRESSURE: 92 MMHG

## 2022-08-30 VITALS — DIASTOLIC BLOOD PRESSURE: 49 MMHG | SYSTOLIC BLOOD PRESSURE: 90 MMHG

## 2022-08-30 VITALS — SYSTOLIC BLOOD PRESSURE: 90 MMHG | DIASTOLIC BLOOD PRESSURE: 48 MMHG

## 2022-08-30 LAB
CHOLEST SERPL-MCNC: 176 MG/DL (ref ?–200)
HDLC SERPL-MCNC: 53 MG/DL (ref 40–60)
TRIGL SERPL-MCNC: 96 MG/DL (ref 30–150)

## 2022-08-30 RX ADMIN — OLANZAPINE SCH MG: 5 TABLET ORAL at 09:00

## 2022-08-30 RX ADMIN — LORAZEPAM PRN MG: 1 TABLET ORAL at 18:33

## 2022-08-30 RX ADMIN — ACETAMINOPHEN PRN MG: 325 TABLET ORAL at 11:23

## 2022-08-30 RX ADMIN — LORAZEPAM PRN MG: 1 TABLET ORAL at 13:04

## 2022-08-30 NOTE — NUR
Patient is anxious, restless, isolative, cooperative with nursing care. A/O X 3 to person, 
place. Pt. is compliant with new medication Risperdal. Pt. states "Zyprexa knocks me down, 
and I feel very sleepy, that's why I refuse it" Pt. ambulates independently, self care. 
Emotional support provided. Fall and safety precautions implemented.

## 2022-08-31 VITALS — SYSTOLIC BLOOD PRESSURE: 92 MMHG | DIASTOLIC BLOOD PRESSURE: 53 MMHG

## 2022-08-31 VITALS — SYSTOLIC BLOOD PRESSURE: 96 MMHG | DIASTOLIC BLOOD PRESSURE: 60 MMHG

## 2022-08-31 VITALS — SYSTOLIC BLOOD PRESSURE: 94 MMHG | DIASTOLIC BLOOD PRESSURE: 56 MMHG

## 2022-08-31 RX ADMIN — ACETAMINOPHEN PRN MG: 325 TABLET ORAL at 08:38

## 2022-08-31 RX ADMIN — ACETAMINOPHEN PRN MG: 325 TABLET ORAL at 20:25

## 2022-08-31 RX ADMIN — LORAZEPAM PRN MG: 1 TABLET ORAL at 16:52

## 2022-08-31 RX ADMIN — LORAZEPAM PRN MG: 1 TABLET ORAL at 09:43

## 2022-08-31 RX ADMIN — Medication SCH MG: at 11:44

## 2022-08-31 NOTE — NUR
Patient is A/O X 3 to person, place. Pt. is isolative, withdrawn, not engage in group 
activities or conversations, anxious. Patient is given Ativan 1 mg at 09:43 for anxiety, 
effective. Pt. is given Tylenol 650 mg at 08:38 for lower back pain, effective. Pt. is 
complaint with medications. Self care. Pt. is encourage to verbalize concerns. Fall and 
safety precautions implemented.

## 2022-08-31 NOTE — NUR
[facility] Discharge Note:



Pt currently resides at [address] [place] [address] [place] [address] with her mother 
([phone]). HOMA spoke with pts mother Roxie ([phone]) who stated that pt is welcome 
back to live there. Pt does not have a current safe transportation secured prior to 
discharge. HOMA will continue 

to work with pt, family and MD to ensure a safe and proper discharge for the pt.

## 2022-08-31 NOTE — NUR
Patient has been asleep most of the night. Water and food offered. Patient continues to have 
poor impulse control and is delusional. Total sleep hours are 9.00. Safety Stratiges are in 
place monitoring for behavior escalation and compliance.

## 2022-09-01 VITALS — SYSTOLIC BLOOD PRESSURE: 104 MMHG | DIASTOLIC BLOOD PRESSURE: 81 MMHG

## 2022-09-01 VITALS — DIASTOLIC BLOOD PRESSURE: 63 MMHG | SYSTOLIC BLOOD PRESSURE: 105 MMHG

## 2022-09-01 VITALS — SYSTOLIC BLOOD PRESSURE: 102 MMHG | DIASTOLIC BLOOD PRESSURE: 66 MMHG

## 2022-09-01 RX ADMIN — LORAZEPAM PRN MG: 1 TABLET ORAL at 16:43

## 2022-09-01 RX ADMIN — Medication SCH MG: at 08:43

## 2022-09-01 RX ADMIN — DOCUSATE SODIUM SCH MG: 100 CAPSULE, LIQUID FILLED ORAL at 20:03

## 2022-09-01 RX ADMIN — LORAZEPAM PRN MG: 1 TABLET ORAL at 16:40

## 2022-09-01 RX ADMIN — LORAZEPAM PRN MG: 1 TABLET ORAL at 10:00

## 2022-09-01 NOTE — NUR
Patient is isolative, demanding, withdrawn, depressed. Patient is given Ativan 1 mg at 10:00 
for anxiety, effective. Patient is given Dulcolax 10 mg suppository for constipation once, 
will be monitored for effectiveness. Patient is compliant with medications. Pt. is encourage 
to verbalize concerns. Fall and safety precautions implemented.

## 2022-09-01 NOTE — NUR
GPS:   Pt.slept 8.30 last night. Denies SI/HI. Calm without any aggressive behavior noted. 
Safety emphasized.

## 2022-09-02 VITALS — DIASTOLIC BLOOD PRESSURE: 71 MMHG | SYSTOLIC BLOOD PRESSURE: 93 MMHG

## 2022-09-02 VITALS — SYSTOLIC BLOOD PRESSURE: 97 MMHG | DIASTOLIC BLOOD PRESSURE: 60 MMHG

## 2022-09-02 VITALS — SYSTOLIC BLOOD PRESSURE: 109 MMHG | DIASTOLIC BLOOD PRESSURE: 64 MMHG

## 2022-09-02 RX ADMIN — Medication PRN MG: at 20:42

## 2022-09-02 RX ADMIN — LORAZEPAM PRN MG: 1 TABLET ORAL at 15:46

## 2022-09-02 RX ADMIN — ACETAMINOPHEN PRN MG: 325 TABLET ORAL at 08:01

## 2022-09-02 RX ADMIN — DOCUSATE SODIUM SCH MG: 100 CAPSULE, LIQUID FILLED ORAL at 20:42

## 2022-09-02 RX ADMIN — DOCUSATE SODIUM SCH MG: 100 CAPSULE, LIQUID FILLED ORAL at 09:09

## 2022-09-02 RX ADMIN — ACETAMINOPHEN PRN MG: 325 TABLET ORAL at 15:45

## 2022-09-02 RX ADMIN — LORAZEPAM PRN MG: 1 TABLET ORAL at 08:00

## 2022-09-02 RX ADMIN — Medication SCH MG: at 09:09

## 2022-09-02 NOTE — NUR
patient is AAO x 3, needy med.seeking asking for Ativan and Tylenol  x2 during shift, 
compliant with all po medication.isolative refused to attend in group activity , will 
continue close monitoring.

## 2022-09-02 NOTE — NUR
patient  [facility] hearing done. Pt did not attend. Hold is upheld under criteria now of just 
grave disability

## 2022-09-03 VITALS — SYSTOLIC BLOOD PRESSURE: 117 MMHG | DIASTOLIC BLOOD PRESSURE: 71 MMHG

## 2022-09-03 VITALS — DIASTOLIC BLOOD PRESSURE: 68 MMHG | SYSTOLIC BLOOD PRESSURE: 93 MMHG

## 2022-09-03 VITALS — DIASTOLIC BLOOD PRESSURE: 53 MMHG | SYSTOLIC BLOOD PRESSURE: 90 MMHG

## 2022-09-03 RX ADMIN — LORAZEPAM PRN MG: 1 TABLET ORAL at 10:03

## 2022-09-03 RX ADMIN — ACETAMINOPHEN PRN MG: 325 TABLET ORAL at 10:04

## 2022-09-03 RX ADMIN — Medication SCH MG: at 08:23

## 2022-09-03 RX ADMIN — LORAZEPAM PRN MG: 1 TABLET ORAL at 20:00

## 2022-09-03 RX ADMIN — DOCUSATE SODIUM SCH MG: 100 CAPSULE, LIQUID FILLED ORAL at 20:00

## 2022-09-03 RX ADMIN — DOCUSATE SODIUM SCH MG: 100 CAPSULE, LIQUID FILLED ORAL at 08:23

## 2022-09-03 RX ADMIN — LORAZEPAM PRN MG: 1 TABLET ORAL at 04:11

## 2022-09-03 RX ADMIN — Medication PRN MG: at 20:00

## 2022-09-03 RX ADMIN — ACETAMINOPHEN PRN MG: 325 TABLET ORAL at 04:11

## 2022-09-03 NOTE — NUR
Received patient is AAO x 3, needy med.seeking asking for Ativan and Tylenol   during shift, 
no interaction with other peers, stay in the room most of shift compliant with all po 
medication.isolative refused to attend in group activity , will continue close monitoring.

## 2022-09-03 NOTE — NUR
Patient spent most of the night in her bed. Patient came out once during the early am asking 
for Ativan and Tylenol. Oral fluids encouraged. Patients affect appeared artificially 
bright. Total sleep hours were 7.45. Safety Stratiges are in place and monitoring the 
patient for any behavior escalation . Patient continues to refuse showering.

## 2022-09-04 VITALS — SYSTOLIC BLOOD PRESSURE: 99 MMHG | DIASTOLIC BLOOD PRESSURE: 64 MMHG

## 2022-09-04 VITALS — DIASTOLIC BLOOD PRESSURE: 63 MMHG | SYSTOLIC BLOOD PRESSURE: 96 MMHG

## 2022-09-04 VITALS — SYSTOLIC BLOOD PRESSURE: 96 MMHG | DIASTOLIC BLOOD PRESSURE: 54 MMHG

## 2022-09-04 RX ADMIN — Medication PRN MG: at 20:31

## 2022-09-04 RX ADMIN — Medication SCH MG: at 08:35

## 2022-09-04 RX ADMIN — DOCUSATE SODIUM SCH MG: 100 CAPSULE, LIQUID FILLED ORAL at 08:35

## 2022-09-04 RX ADMIN — ACETAMINOPHEN PRN MG: 325 TABLET ORAL at 06:06

## 2022-09-04 RX ADMIN — ACETAMINOPHEN PRN MG: 325 TABLET ORAL at 17:15

## 2022-09-04 RX ADMIN — DOCUSATE SODIUM SCH MG: 100 CAPSULE, LIQUID FILLED ORAL at 20:31

## 2022-09-04 RX ADMIN — Medication PRN MG: at 09:17

## 2022-09-04 RX ADMIN — LORAZEPAM PRN MG: 1 TABLET ORAL at 10:51

## 2022-09-04 RX ADMIN — LORAZEPAM PRN MG: 1 TABLET ORAL at 17:15

## 2022-09-04 RX ADMIN — LORAZEPAM PRN MG: 1 TABLET ORAL at 06:06

## 2022-09-04 NOTE — NUR
patient is more  brighter talkative remains isolative stay in her room most of time, only 
requested one time Ativan and Tylenol  as PRN ordered,care rounding done .

## 2022-09-04 NOTE — NUR
Patient was smiling and pleasant last night. Only requests were for Ativan and Tylenol or 
Motrin. Total sleep hours were 8.15. No behavior escalation noted. Safety Stratiges are in 
place.

## 2022-09-05 VITALS — DIASTOLIC BLOOD PRESSURE: 85 MMHG | SYSTOLIC BLOOD PRESSURE: 117 MMHG

## 2022-09-05 VITALS — SYSTOLIC BLOOD PRESSURE: 112 MMHG | DIASTOLIC BLOOD PRESSURE: 66 MMHG

## 2022-09-05 VITALS — DIASTOLIC BLOOD PRESSURE: 47 MMHG | SYSTOLIC BLOOD PRESSURE: 93 MMHG

## 2022-09-05 RX ADMIN — DOCUSATE SODIUM SCH MG: 100 CAPSULE, LIQUID FILLED ORAL at 08:31

## 2022-09-05 RX ADMIN — ACETAMINOPHEN PRN MG: 325 TABLET ORAL at 08:31

## 2022-09-05 RX ADMIN — Medication SCH MG: at 08:46

## 2022-09-05 RX ADMIN — LORAZEPAM PRN MG: 1 TABLET ORAL at 08:31

## 2022-09-05 RX ADMIN — DOCUSATE SODIUM SCH MG: 100 CAPSULE, LIQUID FILLED ORAL at 20:05

## 2022-09-05 RX ADMIN — ACETAMINOPHEN PRN MG: 325 TABLET ORAL at 15:42

## 2022-09-05 RX ADMIN — LORAZEPAM PRN MG: 1 TABLET ORAL at 15:42

## 2022-09-05 NOTE — NUR
Received patient is AAO x 3, needy med.seeking asking for Ativan and Tylenol  x2 during 
shift, compliant with all po medication.isolative refused to attend in group activity , will 
continue close monitoring.

## 2022-09-05 NOTE — NUR
Received patient in the day room watching TV. able to engage in simple conversation. Some 
delusional content noted but subtle. No changes from the previous night except the patient 
was less needy and has not requested Ativan so far this shift. Sleep hours are 7.45 . Safety 
Stratiges remain in place and continuing to monitor for any behavior escalation.

## 2022-09-06 VITALS — SYSTOLIC BLOOD PRESSURE: 127 MMHG | DIASTOLIC BLOOD PRESSURE: 77 MMHG

## 2022-09-06 VITALS — DIASTOLIC BLOOD PRESSURE: 70 MMHG | SYSTOLIC BLOOD PRESSURE: 113 MMHG

## 2022-09-06 VITALS — SYSTOLIC BLOOD PRESSURE: 105 MMHG | DIASTOLIC BLOOD PRESSURE: 75 MMHG

## 2022-09-06 RX ADMIN — LORAZEPAM PRN MG: 1 TABLET ORAL at 17:01

## 2022-09-06 RX ADMIN — ACETAMINOPHEN PRN MG: 325 TABLET ORAL at 02:48

## 2022-09-06 RX ADMIN — LORAZEPAM PRN MG: 1 TABLET ORAL at 10:59

## 2022-09-06 RX ADMIN — LORAZEPAM PRN MG: 1 TABLET ORAL at 02:48

## 2022-09-06 RX ADMIN — ACETAMINOPHEN PRN MG: 325 TABLET ORAL at 17:01

## 2022-09-06 RX ADMIN — DOCUSATE SODIUM SCH MG: 100 CAPSULE, LIQUID FILLED ORAL at 20:18

## 2022-09-06 RX ADMIN — ACETAMINOPHEN PRN MG: 325 TABLET ORAL at 10:59

## 2022-09-06 RX ADMIN — Medication SCH MG: at 08:38

## 2022-09-06 RX ADMIN — DOCUSATE SODIUM SCH MG: 100 CAPSULE, LIQUID FILLED ORAL at 08:38

## 2022-09-06 NOTE — NUR
Patient is compliant with medication. Tylenol and Ativan PRN given x2 during the shift.. No 
other issues identified. compliant with medication. Safety measures maintained. Will endorse 
to the next shift for continuity of care.

## 2022-09-07 VITALS — SYSTOLIC BLOOD PRESSURE: 98 MMHG | DIASTOLIC BLOOD PRESSURE: 61 MMHG

## 2022-09-07 VITALS — DIASTOLIC BLOOD PRESSURE: 75 MMHG | SYSTOLIC BLOOD PRESSURE: 101 MMHG

## 2022-09-07 VITALS — SYSTOLIC BLOOD PRESSURE: 101 MMHG | DIASTOLIC BLOOD PRESSURE: 64 MMHG

## 2022-09-07 RX ADMIN — DOCUSATE SODIUM SCH MG: 100 CAPSULE, LIQUID FILLED ORAL at 20:11

## 2022-09-07 RX ADMIN — Medication PRN MG: at 06:27

## 2022-09-07 RX ADMIN — DOCUSATE SODIUM SCH MG: 100 CAPSULE, LIQUID FILLED ORAL at 09:08

## 2022-09-07 RX ADMIN — LORAZEPAM PRN MG: 1 TABLET ORAL at 06:27

## 2022-09-07 RX ADMIN — LORAZEPAM PRN MG: 1 TABLET ORAL at 12:37

## 2022-09-07 RX ADMIN — Medication PRN MG: at 20:11

## 2022-09-07 RX ADMIN — LORAZEPAM PRN MG: 1 TABLET ORAL at 00:14

## 2022-09-07 RX ADMIN — Medication SCH MG: at 09:08

## 2022-09-08 VITALS — SYSTOLIC BLOOD PRESSURE: 95 MMHG | DIASTOLIC BLOOD PRESSURE: 66 MMHG

## 2022-09-08 RX ADMIN — Medication SCH MG: at 08:00

## 2022-09-08 RX ADMIN — DOCUSATE SODIUM SCH MG: 100 CAPSULE, LIQUID FILLED ORAL at 08:00

## 2022-09-08 RX ADMIN — Medication PRN MG: at 06:06

## 2022-09-08 RX ADMIN — LORAZEPAM PRN MG: 1 TABLET ORAL at 04:37

## 2022-09-08 NOTE — NUR
Pt left on w/c accompanied by CNA to mother waiting in hospital lobby and then private 
vehicle. Left with all noted belongings and paperwork. Denies pain or discomfort. No 
aggressive or combative behavior noted. In no acute distress.

## 2022-09-08 NOTE — NUR
[facility] Discharge Note:



Pt will be discharged to Home located at [address] [place] [address] via 
pts mothers and friend, Monserrat private vehicle transportation at 1PM. HOMA spoke with pts 
mother, Roxie ([phone]) who states she and her friend, Milton are ready to accept the 
patient today. Pt is aware and agreeable with discharge plan. Pt is alert and oriented x4, 
is unable to plan for self-care at this time. However, pt is willing to accept care at home 
by her mother. Pt denies any suicidal or homicidal ideation. Pt will follow-up with her 
outpatient Psychiatrist and Psychologist who will be assigned at [facility] 
located at [address] [place] [phone] via teletherapy ([phone]) after pts 
consultation (SW will confirm in the AM). Pt stated she will follow-up with her outpatient 
Internist. Pt did not provide details to SW. Pt presents with calm mood and congruent 
affect. PHARMACY: [facility] located at [address] [place] [address] ([phone]).